# Patient Record
Sex: MALE | Race: WHITE | NOT HISPANIC OR LATINO | URBAN - METROPOLITAN AREA
[De-identification: names, ages, dates, MRNs, and addresses within clinical notes are randomized per-mention and may not be internally consistent; named-entity substitution may affect disease eponyms.]

---

## 2017-01-05 ENCOUNTER — IMPORTED ENCOUNTER (OUTPATIENT)
Dept: URBAN - METROPOLITAN AREA CLINIC 1 | Facility: CLINIC | Age: 52
End: 2017-01-05

## 2017-01-05 PROBLEM — H04.123: Noted: 2017-01-05

## 2017-01-05 PROBLEM — H52.4: Noted: 2017-01-05

## 2017-01-05 PROCEDURE — S0621 ROUTINE OPHTHALMOLOGICAL EXA: HCPCS

## 2017-01-05 NOTE — PATIENT DISCUSSION
1.  Presbyopia: Rx was given for corrective spectacles if indicated. 2.  Dry Eyes OU -- Recommended to patient to use Artificial Tears BID OU. Gave patient a sample of Refresh Optive **Will consider daily lens PRN if dryness does not improve 3. Return for an appointment in 1 week/cc. with Dr. Kathleen Bacon. 4.  Return for an appointment in 1 year for 40. cc with Dr. Kathleen Bacno.

## 2017-01-18 ENCOUNTER — IMPORTED ENCOUNTER (OUTPATIENT)
Dept: URBAN - METROPOLITAN AREA CLINIC 1 | Facility: CLINIC | Age: 52
End: 2017-01-18

## 2017-01-18 NOTE — PATIENT DISCUSSION
CC today- Final rx for CL given. F/u in 1 year for 40/cc. (Trials for DT1MF given; Trials only given. Patient can call if he likes these trials to order. No CC needed per RBF.

## 2018-04-09 ENCOUNTER — IMPORTED ENCOUNTER (OUTPATIENT)
Dept: URBAN - METROPOLITAN AREA CLINIC 1 | Facility: CLINIC | Age: 53
End: 2018-04-09

## 2018-04-09 PROBLEM — H52.4: Noted: 2018-04-09

## 2018-04-09 PROCEDURE — 92015 DETERMINE REFRACTIVE STATE: CPT

## 2018-04-09 PROCEDURE — S0621 ROUTINE OPHTHALMOLOGICAL EXA: HCPCS

## 2018-04-09 NOTE — PATIENT DISCUSSION
1.  Presbyopia: Rx was given for corrective spectacles if indicated. 2.  Cataracts OU-observe 3. LEXI OU-REC patient to cont using AT BID OU-Gave patient a sample of Blink 4. Return for an appointment in 1 week for Contact lens check. with Dr. Ronit Melgoza. 5.  Return for an appointment in 1 year for 40 and Contact lens check. with Dr. Ronit Melgoza.

## 2018-04-16 ENCOUNTER — IMPORTED ENCOUNTER (OUTPATIENT)
Dept: URBAN - METROPOLITAN AREA CLINIC 1 | Facility: CLINIC | Age: 53
End: 2018-04-16

## 2019-10-04 ENCOUNTER — IMPORTED ENCOUNTER (OUTPATIENT)
Dept: URBAN - METROPOLITAN AREA CLINIC 1 | Facility: CLINIC | Age: 54
End: 2019-10-04

## 2019-10-04 PROBLEM — H52.03: Noted: 2019-10-04

## 2019-10-04 PROCEDURE — S0621 ROUTINE OPHTHALMOLOGICAL EXA: HCPCS

## 2019-10-04 NOTE — PATIENT DISCUSSION
1.  Hyperopia OU- Rx for glasses & CLs given. 2.  Cataracts OU- observe 3. LEXI OU- Use ATs TID OU Routinely. Return for an appointment in 2 week CC with Dr. Bhaskar Sheltno.

## 2019-10-23 ENCOUNTER — IMPORTED ENCOUNTER (OUTPATIENT)
Dept: URBAN - METROPOLITAN AREA CLINIC 1 | Facility: CLINIC | Age: 54
End: 2019-10-23

## 2019-10-23 NOTE — PATIENT DISCUSSION
CC today. Patient did not come in wearing trial CTLs however was c/o issues with midrange vision in trials. Changes made to improve va. Ok per RBF to final new CTL Rx without CC if patient is happy with new trials.

## 2021-06-23 ENCOUNTER — IMPORTED ENCOUNTER (OUTPATIENT)
Dept: URBAN - METROPOLITAN AREA CLINIC 1 | Facility: CLINIC | Age: 56
End: 2021-06-23

## 2021-06-23 PROBLEM — H52.03: Noted: 2021-06-23

## 2021-06-23 PROBLEM — H52.4: Noted: 2021-06-23

## 2021-06-23 PROBLEM — H52.223: Noted: 2021-06-23

## 2021-06-23 PROCEDURE — S0621 ROUTINE OPHTHALMOLOGICAL EXA: HCPCS

## 2021-06-23 NOTE — PATIENT DISCUSSION
1.  Hyperopia w/ Astigmatism OU -- Rx was given for corrective spectacles if indicated. 2.  Presbyopia 3. Nuclear Cataract OU -- Observe. 4.  LEXI w/ PEK OU -- Cont ATs BID OU routinely. 5.  () Family Hx Glaucoma (Father) Changes made to OS lens and new trials to be ordered by Optical ([employer] Multifocal; OD D/OS N). Return for an appointment in 2 week cc with Dr. Edis Hammond.

## 2021-07-20 ENCOUNTER — IMPORTED ENCOUNTER (OUTPATIENT)
Dept: URBAN - METROPOLITAN AREA CLINIC 1 | Facility: CLINIC | Age: 56
End: 2021-07-20

## 2021-07-20 NOTE — PATIENT DISCUSSION
1. CC Today -- Finalized CTL Rx and given to patient ([facility]). Good comfort fit and vision. Return for an appointment in June 40/cc with Dr. Guerita Esquivel.

## 2022-04-08 ASSESSMENT — VISUAL ACUITY
OS_CC: J1
OU_CC: J1
OS_SC: 20/20
OD_CC: J7
OD_CC: J1
OD_SC: 20/20
OS_CC: J1+
OS_SC: 20/20
OD_CC: J5
OS_SC: 20/20
OS_SC: 20/20
OS_CC: J1
OS_SC: 20/25+2
OD_SC: 20/20
OS_SC: 20/20-1
OD_SC: 20/20
OD_CC: J1
OD_SC: 20/20
OD_CC: J7
OS_CC: J2
OS_CC: J1

## 2022-04-08 ASSESSMENT — TONOMETRY
OD_IOP_MMHG: 14
OD_IOP_MMHG: 15
OD_IOP_MMHG: 14
OD_IOP_MMHG: 15
OS_IOP_MMHG: 14

## 2022-04-08 ASSESSMENT — KERATOMETRY
OD_K2POWER_DIOPTERS: 43.00
OD_K1POWER_DIOPTERS: 42.25
OD_AXISANGLE2_DEGREES: 063
OS_K1POWER_DIOPTERS: 42.75
OS_AXISANGLE2_DEGREES: 077
OS_K2POWER_DIOPTERS: 42.75
OD_AXISANGLE_DEGREES: 153
OS_AXISANGLE_DEGREES: 167

## 2024-01-08 ENCOUNTER — COMPREHENSIVE EXAM (OUTPATIENT)
Dept: URBAN - METROPOLITAN AREA CLINIC 1 | Facility: CLINIC | Age: 59
End: 2024-01-08

## 2024-01-08 DIAGNOSIS — H52.223: ICD-10-CM

## 2024-01-08 DIAGNOSIS — H52.4: ICD-10-CM

## 2024-01-08 DIAGNOSIS — Z01.00: ICD-10-CM

## 2024-01-08 DIAGNOSIS — Z46.0: ICD-10-CM

## 2024-01-08 DIAGNOSIS — H52.03: ICD-10-CM

## 2024-01-08 PROCEDURE — 92310-12 CONTACT LENS FITTING - 90

## 2024-01-08 PROCEDURE — 92014 COMPRE OPH EXAM EST PT 1/>: CPT

## 2024-01-08 PROCEDURE — 92015 DETERMINE REFRACTIVE STATE: CPT

## 2024-01-08 ASSESSMENT — VISUAL ACUITY
OD_CC: 20/25+1
OS_CC: 20/25-2
OD_BAT: 20/30
OS_CC: J1+
OS_BAT: 20/30

## 2024-01-08 ASSESSMENT — TONOMETRY
OD_IOP_MMHG: 14
OS_IOP_MMHG: 14

## 2024-01-24 ENCOUNTER — CONTACT LENSES/GLASSES VISIT (OUTPATIENT)
Dept: URBAN - METROPOLITAN AREA CLINIC 1 | Facility: CLINIC | Age: 59
End: 2024-01-24

## 2024-01-24 DIAGNOSIS — Z46.0: ICD-10-CM

## 2024-01-24 PROCEDURE — 92310-F CONTACT LENS FITTING FOLLOW UP

## 2024-01-24 ASSESSMENT — VISUAL ACUITY
OU_CC: J1+
OU_CC: 20/15
OD_CC: 20/15